# Patient Record
Sex: FEMALE | Race: WHITE | Employment: UNEMPLOYED | URBAN - METROPOLITAN AREA
[De-identification: names, ages, dates, MRNs, and addresses within clinical notes are randomized per-mention and may not be internally consistent; named-entity substitution may affect disease eponyms.]

---

## 2017-09-21 PROCEDURE — 87624 HPV HI-RISK TYP POOLED RSLT: CPT | Performed by: OBSTETRICS & GYNECOLOGY

## 2017-09-21 PROCEDURE — 88175 CYTOPATH C/V AUTO FLUID REDO: CPT | Performed by: OBSTETRICS & GYNECOLOGY

## 2018-12-05 ENCOUNTER — OFFICE VISIT (OUTPATIENT)
Dept: FAMILY MEDICINE CLINIC | Facility: CLINIC | Age: 38
End: 2018-12-05
Payer: COMMERCIAL

## 2018-12-05 VITALS
HEIGHT: 66 IN | WEIGHT: 130 LBS | HEART RATE: 73 BPM | DIASTOLIC BLOOD PRESSURE: 62 MMHG | SYSTOLIC BLOOD PRESSURE: 104 MMHG | BODY MASS INDEX: 20.89 KG/M2 | OXYGEN SATURATION: 99 % | TEMPERATURE: 99 F

## 2018-12-05 DIAGNOSIS — J02.9 SORE THROAT: Primary | ICD-10-CM

## 2018-12-05 DIAGNOSIS — J06.9 VIRAL UPPER RESPIRATORY TRACT INFECTION: ICD-10-CM

## 2018-12-05 PROCEDURE — 87880 STREP A ASSAY W/OPTIC: CPT | Performed by: FAMILY MEDICINE

## 2018-12-05 PROCEDURE — 99213 OFFICE O/P EST LOW 20 MIN: CPT | Performed by: FAMILY MEDICINE

## 2018-12-05 NOTE — PROGRESS NOTES
CHIEF COMPLAINT:   Patient presents with:  Sore Throat: x 3 dys         HPI:   Anusha Camacho is a 43year old female presents to clinic with complaint of sore throat. Patient has had for 3 days.  Patient denies congestion, cough, fever, headache, nausea, r mucosa pink and noninflamed  THROAT: oral mucosa pink, moist. Posterior pharynx erythematous and injected. no exudates. Tonsils 2/4. Breath not malodorous   NECK: supple, non-tender  LUNGS: clear to auscultation bilaterally, no wheezes or rhonchi.  Elina Gonzalez

## 2018-12-05 NOTE — PATIENT INSTRUCTIONS
Most viral illnesses get worse for the first 3-5 days, then plateau and improve gradually over the next 3-5 days. Monitor symptoms for now.    Use otc meds for comfort as needed--  Warm saltwater gargles  otc pain relief and cold remedies as needed-- [facility]

## 2018-12-10 ENCOUNTER — TELEPHONE (OUTPATIENT)
Dept: FAMILY MEDICINE CLINIC | Facility: CLINIC | Age: 38
End: 2018-12-10

## 2018-12-10 NOTE — TELEPHONE ENCOUNTER
KANDICE for patient confirming new patient appt with Dr. Chip Reid 12/10/18 at 12 noon. Through [employer] she indicated she wanted to cancel.

## 2019-01-11 ENCOUNTER — OFFICE VISIT (OUTPATIENT)
Dept: FAMILY MEDICINE CLINIC | Facility: CLINIC | Age: 39
End: 2019-01-11
Payer: COMMERCIAL

## 2019-01-11 VITALS
HEIGHT: 66 IN | WEIGHT: 129 LBS | HEART RATE: 80 BPM | RESPIRATION RATE: 14 BRPM | DIASTOLIC BLOOD PRESSURE: 60 MMHG | SYSTOLIC BLOOD PRESSURE: 122 MMHG | BODY MASS INDEX: 20.73 KG/M2

## 2019-01-11 DIAGNOSIS — Z12.31 VISIT FOR SCREENING MAMMOGRAM: ICD-10-CM

## 2019-01-11 DIAGNOSIS — Z00.00 ROUTINE GENERAL MEDICAL EXAMINATION AT A HEALTH CARE FACILITY: Primary | ICD-10-CM

## 2019-01-11 PROCEDURE — 99385 PREV VISIT NEW AGE 18-39: CPT | Performed by: FAMILY MEDICINE

## 2019-01-11 NOTE — PROGRESS NOTES
HPI:   Mamie Moss is a 43year old female who presents for a complete physical exam.  Last pap:  Seeing Dr. Natalie Jeffrey  Last mammogram:  No previous   Self exams:  yes  Menses:  regular  Contraception:  tubal  Previous colonoscopy:  n/a  Previous DEXA:  n/a. alzheimer   • Other (Other) Maternal Grandfather         liver cirhosis, alcoholic   • Cancer Paternal Grandfather         lung   • Cancer Father         prostate   • Other (Other) Mother         iritis   • Cancer Paternal Uncle         pancreatic       So exercise. · The patient is asked to return for CPX in 1 year.   Orders Placed This Encounter      Lipid Panel      Comp Metabolic Panel (14)      CBC, Platelet, No Differential [E]      TSH W Reflex To Free T4      Meds & Refills for this Visit:  Request

## 2019-02-11 ENCOUNTER — APPOINTMENT (OUTPATIENT)
Dept: LAB | Age: 39
End: 2019-02-11
Attending: FAMILY MEDICINE
Payer: COMMERCIAL

## 2019-02-11 DIAGNOSIS — Z00.00 ROUTINE GENERAL MEDICAL EXAMINATION AT A HEALTH CARE FACILITY: ICD-10-CM

## 2019-02-11 LAB
ALBUMIN SERPL-MCNC: 3.8 G/DL (ref 3.1–4.5)
ALBUMIN/GLOB SERPL: 1.2 {RATIO} (ref 1–2)
ALP LIVER SERPL-CCNC: 37 U/L (ref 37–98)
ALT SERPL-CCNC: 21 U/L (ref 14–54)
ANION GAP SERPL CALC-SCNC: 6 MMOL/L (ref 0–18)
AST SERPL-CCNC: 17 U/L (ref 15–41)
BILIRUB SERPL-MCNC: 0.6 MG/DL (ref 0.1–2)
BUN BLD-MCNC: 13 MG/DL (ref 8–20)
BUN/CREAT SERPL: 15.3 (ref 10–20)
CALCIUM BLD-MCNC: 9.1 MG/DL (ref 8.3–10.3)
CHLORIDE SERPL-SCNC: 108 MMOL/L (ref 101–111)
CHOLEST SMN-MCNC: 142 MG/DL (ref ?–200)
CO2 SERPL-SCNC: 25 MMOL/L (ref 22–32)
CREAT BLD-MCNC: 0.85 MG/DL (ref 0.55–1.02)
DEPRECATED RDW RBC AUTO: 45.1 FL (ref 35.1–46.3)
ERYTHROCYTE [DISTWIDTH] IN BLOOD BY AUTOMATED COUNT: 14.1 % (ref 11–15)
GLOBULIN PLAS-MCNC: 3.3 G/DL (ref 2.8–4.4)
GLUCOSE BLD-MCNC: 94 MG/DL (ref 70–99)
HCT VFR BLD AUTO: 37.7 % (ref 35–48)
HDLC SERPL-MCNC: 60 MG/DL (ref 40–59)
HGB BLD-MCNC: 12.1 G/DL (ref 12–16)
LDLC SERPL CALC-MCNC: 72 MG/DL (ref ?–100)
M PROTEIN MFR SERPL ELPH: 7.1 G/DL (ref 6.4–8.2)
MCH RBC QN AUTO: 28.3 PG (ref 26–34)
MCHC RBC AUTO-ENTMCNC: 32.1 G/DL (ref 31–37)
MCV RBC AUTO: 88.3 FL (ref 80–100)
NONHDLC SERPL-MCNC: 82 MG/DL (ref ?–130)
OSMOLALITY SERPL CALC.SUM OF ELEC: 288 MOSM/KG (ref 275–295)
PLATELET # BLD AUTO: 277 10(3)UL (ref 150–450)
POTASSIUM SERPL-SCNC: 4.2 MMOL/L (ref 3.6–5.1)
RBC # BLD AUTO: 4.27 X10(6)UL (ref 3.8–5.3)
SODIUM SERPL-SCNC: 139 MMOL/L (ref 136–144)
TRIGL SERPL-MCNC: 48 MG/DL (ref 30–149)
TSI SER-ACNC: 1.69 MIU/ML (ref 0.35–5.5)
VLDLC SERPL CALC-MCNC: 10 MG/DL (ref 0–30)
WBC # BLD AUTO: 5.6 X10(3) UL (ref 4–11)

## 2019-02-11 PROCEDURE — 80061 LIPID PANEL: CPT

## 2019-02-11 PROCEDURE — 84443 ASSAY THYROID STIM HORMONE: CPT

## 2019-02-11 PROCEDURE — 85027 COMPLETE CBC AUTOMATED: CPT

## 2019-02-11 PROCEDURE — 80053 COMPREHEN METABOLIC PANEL: CPT

## 2019-02-19 ENCOUNTER — TELEPHONE (OUTPATIENT)
Dept: FAMILY MEDICINE CLINIC | Facility: CLINIC | Age: 39
End: 2019-02-19

## 2019-02-19 NOTE — TELEPHONE ENCOUNTER
Patient notified. Patient would like to check Vit D level with next labs. Does not desire to do it sooner. Pt advised to discuss request with provider before next labs are ordered.

## 2019-02-20 ENCOUNTER — TELEPHONE (OUTPATIENT)
Dept: FAMILY MEDICINE CLINIC | Facility: CLINIC | Age: 39
End: 2019-02-20

## 2019-05-14 ENCOUNTER — TELEPHONE (OUTPATIENT)
Dept: FAMILY MEDICINE CLINIC | Facility: CLINIC | Age: 39
End: 2019-05-14

## 2019-05-14 PROCEDURE — 88360 TUMOR IMMUNOHISTOCHEM/MANUAL: CPT | Performed by: RADIOLOGY

## 2019-05-14 PROCEDURE — 88305 TISSUE EXAM BY PATHOLOGIST: CPT | Performed by: RADIOLOGY

## 2019-05-14 NOTE — TELEPHONE ENCOUNTER
Andrea Norwood from [facility] is calling requesting to speak to nurse regarding breast biopsy.  Patient needs to see surgeon and they need a name of a surgeon from Dr. Blanka Sosa

## 2019-05-16 ENCOUNTER — TELEPHONE (OUTPATIENT)
Dept: FAMILY MEDICINE CLINIC | Facility: CLINIC | Age: 39
End: 2019-05-16

## 2019-05-16 ENCOUNTER — TELEPHONE (OUTPATIENT)
Dept: HEMATOLOGY/ONCOLOGY | Facility: HOSPITAL | Age: 39
End: 2019-05-16

## 2019-05-16 NOTE — TELEPHONE ENCOUNTER
Spoke with patient regarding newly diagnosed breast cancer. 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 Tumor markers are pending. We will contact her with these results.  Pt has been referred to Dr. Nelida Sánchez, we discussed the multidisciplinary

## 2019-05-16 NOTE — TELEPHONE ENCOUNTER
Breast navigator with [facility] is calling to talk to Dr Sonia Singh about her results. Her test was done through [facility] and she thinks that they are under the impression that results will be given by Dr Sonia Singh office.

## 2019-05-16 NOTE — TELEPHONE ENCOUNTER
Discussed with Leanna Bean breast navigator she will call patient to give her the results and get her in with Dr Ambriz Flavin

## 2019-05-17 ENCOUNTER — TELEPHONE (OUTPATIENT)
Dept: HEMATOLOGY/ONCOLOGY | Facility: HOSPITAL | Age: 39
End: 2019-05-17

## 2019-05-20 NOTE — TELEPHONE ENCOUNTER
Spoke with patient regarding her pathology and biomarkers. We discussed that she is ER/HI+ and Her 2 negative. We discussed Ki 67. Pt has been scheduled with surgery and genetics and medical oncology.  She will phone in the interim with any other questions

## 2019-05-21 ENCOUNTER — GENETICS ENCOUNTER (OUTPATIENT)
Dept: GENETICS | Facility: HOSPITAL | Age: 39
End: 2019-05-21
Attending: INTERNAL MEDICINE
Payer: COMMERCIAL

## 2019-05-21 ENCOUNTER — NURSE NAVIGATOR ENCOUNTER (OUTPATIENT)
Dept: HEMATOLOGY/ONCOLOGY | Facility: HOSPITAL | Age: 39
End: 2019-05-21

## 2019-05-21 ENCOUNTER — APPOINTMENT (OUTPATIENT)
Dept: HEMATOLOGY/ONCOLOGY | Facility: HOSPITAL | Age: 39
End: 2019-05-21
Attending: INTERNAL MEDICINE
Payer: COMMERCIAL

## 2019-05-21 ENCOUNTER — OFFICE VISIT (OUTPATIENT)
Dept: SURGERY | Facility: CLINIC | Age: 39
End: 2019-05-21
Payer: COMMERCIAL

## 2019-05-21 VITALS
HEART RATE: 72 BPM | TEMPERATURE: 98 F | BODY MASS INDEX: 20.99 KG/M2 | DIASTOLIC BLOOD PRESSURE: 67 MMHG | OXYGEN SATURATION: 100 % | WEIGHT: 130.63 LBS | SYSTOLIC BLOOD PRESSURE: 105 MMHG | RESPIRATION RATE: 18 BRPM | HEIGHT: 65.98 IN

## 2019-05-21 DIAGNOSIS — Z17.0 MALIGNANT NEOPLASM OF UPPER-OUTER QUADRANT OF RIGHT BREAST IN FEMALE, ESTROGEN RECEPTOR POSITIVE (HCC): Primary | ICD-10-CM

## 2019-05-21 DIAGNOSIS — C50.411 MALIGNANT NEOPLASM OF UPPER-OUTER QUADRANT OF RIGHT BREAST IN FEMALE, ESTROGEN RECEPTOR POSITIVE (HCC): Primary | ICD-10-CM

## 2019-05-21 PROCEDURE — 99245 OFF/OP CONSLTJ NEW/EST HI 55: CPT | Performed by: SURGERY

## 2019-05-21 PROCEDURE — 99245 OFF/OP CONSLTJ NEW/EST HI 55: CPT | Performed by: INTERNAL MEDICINE

## 2019-05-21 NOTE — PROGRESS NOTES
Referring Provider: Dr. Joseline Jain          Reason for Referral:  Ms. Elizabeth Plaza was referred for genetic counseling because of a new diagnosis of IDC with DCIS of the right breast at age 35.       Ms. Aden Ferrer is a 35 y cancer cases are sporadic, approximately 5-10% of women with breast cancer have a hereditary cancer syndrome. Mutations in the genes, BRCA1 and BRCA2, account for the majority of hereditary breast and ovarian cancer families.   Mutations in genes other than for ovarian cancer. Ms. Aakash Costello indicated that these results may affect her surgical decision-making.        For some of the genes for which testing is available, the associated cancer risks have yet to be determined and medical management recommendations m was spent in consultation with Ms. Chew.

## 2019-05-21 NOTE — PROGRESS NOTES
Met with patient and her mom in clinic. Re- introduced myself as the breast navigator nurse and explained my role and how I will work with all of the physicians involved in her care.  Explained the role of all of the physicians involved in her care floresita

## 2019-05-21 NOTE — CONSULTS
Cancer Center Report of Consultation    Patient Name: Jordi Duarte   YOB: 1980   Medical Record Number: [id number]   CSN: [id number]   Consulting Physician: Naren Quezada MD  Referring Physician(s): Lane Sellers MD  Date of Consultation: TAB0  Ectopic0  Multiple1  Live Births3   Menarche age 14. LMP 5/19. 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 Has a 8year-old daughter (5th grade). Has 6year-old twins-boy and girl (2nd grade). Has a 3year-old son.     Allergies: regions. Chest: Clear to auscultation. Heart: Regular rate and rhythm. S1S2 normal.  Abdomen: Soft, non tender with good bowel sounds. No hepatosplenomegaly/mass. Extremities: Pedal pulses are present. No edema. Neurological: Grossly intact.      Brent Barr were spent  with the patient during this encounter and over  50% of that time  was spent face-to-face on counseling and coordination of care.   We discussed  patient’s prognosis, treatment options available for breast ca, including side effects of treatment

## 2019-05-22 NOTE — H&P
History provided by:patient and mother  HPI:   HPI  The patient is a 50-year-old female seen at the request of her primary care physician regarding a new diagnosis of breast cancer.   The patient underwent her first, routine mammogram.  This was followed by Use      Smoking status: Never Smoker      Smokeless tobacco: Never Used    Alcohol use: Yes      Alcohol/week: 1.2 oz      Types: 2 Standard drinks or equivalent per week      Comment: once a week    Drug use: No    Allergies/Medications:    Allergies:   D rhonchi. She has no rales. She exhibits no mass. Right breast exhibits no inverted nipple, no mass, no nipple discharge, no skin change and no tenderness.  Left breast exhibits no inverted nipple, no mass, no nipple discharge, no skin change and no tenderne RIGHT BREAST ULTRASOUND:  Targeted scans of the area of concern as well as four quadrant and  axillary scans were obtained. An irregular hypoechoic area is identified at the 10:00 position, 5 cm  from the nipple, felt to correspond to site of concern.  It Result Interpretation   Estrogen Receptor   -   6F11 100 % Positive Cells Strong Positive   Progesterone Receptor   -   16 95 % Positive Cells Strong Positive   KI-67   -   MM1 7 % Positive Cells Low   Her2   -   CB11 1+ Negative     this.     · In regards to chemotherapy, she has met with Dr. Wilder Thakkar. Based on the final pathology report it will be determined if chemotherapy would provide any additional benefit. · Because of her age, she does qualify for genetic testing.   She has met

## 2019-05-28 ENCOUNTER — HOSPITAL ENCOUNTER (OUTPATIENT)
Dept: MRI IMAGING | Facility: HOSPITAL | Age: 39
Discharge: HOME | End: 2019-05-28
Attending: SURGERY
Payer: COMMERCIAL

## 2019-05-28 DIAGNOSIS — C50.411 MALIGNANT NEOPLASM OF UPPER-OUTER QUADRANT OF RIGHT BREAST IN FEMALE, ESTROGEN RECEPTOR POSITIVE (HCC): ICD-10-CM

## 2019-05-28 DIAGNOSIS — Z17.0 MALIGNANT NEOPLASM OF UPPER-OUTER QUADRANT OF RIGHT BREAST IN FEMALE, ESTROGEN RECEPTOR POSITIVE (HCC): ICD-10-CM

## 2019-05-28 PROCEDURE — A9575 INJ GADOTERATE MEGLUMI 0.1ML: HCPCS

## 2019-05-28 PROCEDURE — 77049 MRI BREAST C-+ W/CAD BI: CPT | Performed by: SURGERY

## 2019-05-29 ENCOUNTER — TELEPHONE (OUTPATIENT)
Dept: HEMATOLOGY/ONCOLOGY | Facility: HOSPITAL | Age: 39
End: 2019-05-29

## 2019-05-29 ENCOUNTER — TELEPHONE (OUTPATIENT)
Dept: SURGERY | Facility: CLINIC | Age: 39
End: 2019-05-29

## 2019-05-29 ENCOUNTER — GENETICS ENCOUNTER (OUTPATIENT)
Dept: HEMATOLOGY/ONCOLOGY | Facility: HOSPITAL | Age: 39
End: 2019-05-29

## 2019-05-29 DIAGNOSIS — C50.411 MALIGNANT NEOPLASM OF UPPER-OUTER QUADRANT OF RIGHT BREAST IN FEMALE, ESTROGEN RECEPTOR POSITIVE (HCC): Primary | ICD-10-CM

## 2019-05-29 DIAGNOSIS — R92.8 ABNORMAL MRI, BREAST: ICD-10-CM

## 2019-05-29 DIAGNOSIS — Z17.0 MALIGNANT NEOPLASM OF UPPER-OUTER QUADRANT OF RIGHT BREAST IN FEMALE, ESTROGEN RECEPTOR POSITIVE (HCC): Primary | ICD-10-CM

## 2019-05-29 NOTE — TELEPHONE ENCOUNTER
I called the patient did discuss her MRI results. The right breast MRI shows 6.7 cm of disease.   I feel she requires a right breast mastectomy to adequately control the disease in her breast.  She would still be a candidate for nipple sparing mastectomy f

## 2019-05-29 NOTE — PROGRESS NOTES
Referring Provider:       Dr. Sherice Ibarra                                               Reason for Referral:  Ms. Bisi Nevarez had [facility] Breast Cancer STAT panel genetic testing per del/dup), GREM1 (only del/dup), HOXB13, KIT, MEN1,  NTHL1 (sequencing only), MLH1, MSH2, MSH3, MSH6, MUTYH, NBN, PALB2, PDGFRA, PMS2, POLD1, POLE, PTEN, RAD50, RAD51C, RAD51D, SDHB, SDHA, SDHC, SDHD, SMAD4, RSPCO8O, STK11, TP53, VHL.   These results were ph

## 2019-05-29 NOTE — TELEPHONE ENCOUNTER
Spoke with patient regarding recent MRI results. We discussed recommendation for mastectomy and also MRI guided biopsy. Pt verbalizes understanding of these.  Pt states that she did see a [facility] yesterday, and is meeting with the plastic juliane

## 2019-10-17 ENCOUNTER — TELEPHONE (OUTPATIENT)
Dept: FAMILY MEDICINE CLINIC | Facility: CLINIC | Age: 39
End: 2019-10-17

## 2019-10-17 NOTE — TELEPHONE ENCOUNTER
Patient called requesting to speak with nurse to go over previous lab results, had questions regarding glucose levels

## 2020-09-28 ENCOUNTER — APPOINTMENT (OUTPATIENT)
Dept: LAB | Age: 40
End: 2020-09-28
Attending: OBSTETRICS & GYNECOLOGY
Payer: COMMERCIAL

## 2020-09-28 PROBLEM — E55.9 VITAMIN D DEFICIENCY: Status: ACTIVE | Noted: 2020-09-28

## 2020-09-28 PROCEDURE — 36415 COLL VENOUS BLD VENIPUNCTURE: CPT | Performed by: OBSTETRICS & GYNECOLOGY

## 2020-09-28 PROCEDURE — 82306 VITAMIN D 25 HYDROXY: CPT | Performed by: OBSTETRICS & GYNECOLOGY

## 2021-04-09 DIAGNOSIS — Z23 NEED FOR VACCINATION: ICD-10-CM

## 2021-10-01 ENCOUNTER — TELEPHONE (OUTPATIENT)
Dept: FAMILY MEDICINE CLINIC | Facility: CLINIC | Age: 41
End: 2021-10-01

## 2021-10-01 DIAGNOSIS — Z00.00 LABORATORY EXAMINATION ORDERED AS PART OF A COMPLETE PHYSICAL EXAMINATION: Primary | ICD-10-CM

## 2021-10-01 NOTE — TELEPHONE ENCOUNTER
Please add Vitamin D only if covered by insurance. Please enter lab orders for the patient's upcoming physical appointment. Physical scheduled:    Your appointments     Date & Time Appointment Department [facility])    Oct 12, 2021  8:00 AM CDT Physical

## 2021-10-04 NOTE — TELEPHONE ENCOUNTER
Spoke to patient to advise her that Vitamin D was ordered by another physician but that her fasting labs were entered to "[facility]. She verbalized understanding.

## 2021-10-12 ENCOUNTER — OFFICE VISIT (OUTPATIENT)
Dept: FAMILY MEDICINE CLINIC | Facility: CLINIC | Age: 41
End: 2021-10-12
Payer: COMMERCIAL

## 2021-10-12 VITALS
HEART RATE: 68 BPM | OXYGEN SATURATION: 99 % | RESPIRATION RATE: 16 BRPM | BODY MASS INDEX: 20.57 KG/M2 | SYSTOLIC BLOOD PRESSURE: 100 MMHG | WEIGHT: 128 LBS | DIASTOLIC BLOOD PRESSURE: 60 MMHG | TEMPERATURE: 97 F | HEIGHT: 66.2 IN

## 2021-10-12 DIAGNOSIS — Z17.0 MALIGNANT NEOPLASM OF UPPER-OUTER QUADRANT OF RIGHT BREAST IN FEMALE, ESTROGEN RECEPTOR POSITIVE (HCC): ICD-10-CM

## 2021-10-12 DIAGNOSIS — C50.411 MALIGNANT NEOPLASM OF UPPER-OUTER QUADRANT OF RIGHT BREAST IN FEMALE, ESTROGEN RECEPTOR POSITIVE (HCC): ICD-10-CM

## 2021-10-12 DIAGNOSIS — R05.3 CHRONIC COUGH: ICD-10-CM

## 2021-10-12 DIAGNOSIS — Z00.00 WELL ADULT EXAM: Primary | ICD-10-CM

## 2021-10-12 PROCEDURE — 3074F SYST BP LT 130 MM HG: CPT | Performed by: PHYSICIAN ASSISTANT

## 2021-10-12 PROCEDURE — 99396 PREV VISIT EST AGE 40-64: CPT | Performed by: PHYSICIAN ASSISTANT

## 2021-10-12 PROCEDURE — 3008F BODY MASS INDEX DOCD: CPT | Performed by: PHYSICIAN ASSISTANT

## 2021-10-12 PROCEDURE — 3078F DIAST BP <80 MM HG: CPT | Performed by: PHYSICIAN ASSISTANT

## 2021-10-12 RX ORDER — THYROID 30 MG/1
30 TABLET ORAL DAILY
COMMUNITY
Start: 2021-09-21

## 2021-10-12 NOTE — PROGRESS NOTES
DATE OF EXAM  10/12/2021    CHIEF COMPLAINT/REASON FOR VISIT  Annual exam.    HISTORY OF PRESENT ILLNESS  Antonio Cook presents today for routine annual physical examination. She has been doing pretty well overall.  Notes that she has had a persistent dr Never Used    Vaping Use      Vaping Use: Never used    Substance and Sexual Activity      Alcohol use:  Yes        Alcohol/week: 2.0 standard drinks        Types: 2 Standard drinks or equivalent per week        Comment: once a week      Drug use: No      S No nausea, vomiting, diarrhea, or constipation. No change in bowel movements. No black or tarry stools or blood in the stool. : No increased frequency or urgency. No hematuria. No menstrual problems.  BREASTS:  Patient denies any lumps, bumps, discharge monitoring moles, adequate calcium and vitamin D intake. Health maintenance is up to date as shown above. Recommend complete physical exams every year.     2. Chronic cough  Recommend that we get chest x-ray- low threshold for imaging with hx of breast can

## 2021-10-13 ENCOUNTER — HOSPITAL ENCOUNTER (OUTPATIENT)
Dept: GENERAL RADIOLOGY | Age: 41
Discharge: HOME | End: 2021-10-13
Attending: PHYSICIAN ASSISTANT
Payer: COMMERCIAL

## 2021-10-13 DIAGNOSIS — R05.3 CHRONIC COUGH: ICD-10-CM

## 2021-10-13 PROCEDURE — 71046 X-RAY EXAM CHEST 2 VIEWS: CPT | Performed by: PHYSICIAN ASSISTANT

## 2021-11-02 ENCOUNTER — HOSPITAL ENCOUNTER (OUTPATIENT)
Age: 41
Discharge: HOME | End: 2021-11-02
Payer: COMMERCIAL

## 2021-11-02 VITALS
HEIGHT: 67 IN | DIASTOLIC BLOOD PRESSURE: 71 MMHG | BODY MASS INDEX: 20.4 KG/M2 | TEMPERATURE: 100 F | HEART RATE: 57 BPM | SYSTOLIC BLOOD PRESSURE: 108 MMHG | OXYGEN SATURATION: 98 % | WEIGHT: 130 LBS | RESPIRATION RATE: 18 BRPM

## 2021-11-02 DIAGNOSIS — Z11.52 ENCOUNTER FOR SCREENING FOR COVID-19: ICD-10-CM

## 2021-11-02 DIAGNOSIS — R11.2 NAUSEA AND VOMITING IN ADULT: Primary | ICD-10-CM

## 2021-11-02 DIAGNOSIS — Z20.822 LAB TEST NEGATIVE FOR COVID-19 VIRUS: ICD-10-CM

## 2021-11-02 PROCEDURE — 99213 OFFICE O/P EST LOW 20 MIN: CPT | Performed by: PHYSICIAN ASSISTANT

## 2021-11-02 PROCEDURE — U0002 COVID-19 LAB TEST NON-CDC: HCPCS | Performed by: PHYSICIAN ASSISTANT

## 2021-11-02 NOTE — ED PROVIDER NOTES
Patient Seen in: [facility] [address]      History   Patient presents with:  Covid-19 Test    Stated Complaint: [id number] testing (family of 4)    Subjective:   HPI    58-year-old female presents to the IC for Covid testing.   Has had a few days and neck supple. Skin:     General: Skin is warm and dry. Capillary Refill: Capillary refill takes less than 2 seconds. Neurological:      Mental Status: She is alert.              ED Course     Labs Reviewed   RAPID SARS-COV-2 BY PCR - Normal

## 2022-04-18 ENCOUNTER — TELEPHONE (OUTPATIENT)
Dept: FAMILY MEDICINE CLINIC | Facility: CLINIC | Age: 42
End: 2022-04-18

## 2022-04-18 NOTE — TELEPHONE ENCOUNTER
Patient has covid and feels fine however her parents are hounding her to contact her doctor and ask about the medication that is out for people who have covid and see if she can get a prescription for it.  Patient calling for the sake of her parents

## 2022-04-20 NOTE — TELEPHONE ENCOUNTER
Called [facility] that she has comorbidity (cancer of breast) but if it is more than 5 days from onset of symptoms it is too late to start this

## 2022-04-27 RX ORDER — SPIRONOLACTONE 25 MG/1
25 TABLET ORAL DAILY
COMMUNITY
Start: 2022-03-08

## 2022-05-03 ENCOUNTER — OFFICE VISIT (OUTPATIENT)
Dept: FAMILY MEDICINE CLINIC | Facility: CLINIC | Age: 42
End: 2022-05-03
Payer: COMMERCIAL

## 2022-05-03 VITALS
BODY MASS INDEX: 21.38 KG/M2 | HEART RATE: 72 BPM | RESPIRATION RATE: 16 BRPM | TEMPERATURE: 97 F | DIASTOLIC BLOOD PRESSURE: 60 MMHG | OXYGEN SATURATION: 99 % | WEIGHT: 133 LBS | HEIGHT: 66 IN | SYSTOLIC BLOOD PRESSURE: 100 MMHG

## 2022-05-03 DIAGNOSIS — R49.0 HOARSENESS OF VOICE: ICD-10-CM

## 2022-05-03 DIAGNOSIS — R05.9 COUGH: Primary | ICD-10-CM

## 2022-05-03 PROCEDURE — 3078F DIAST BP <80 MM HG: CPT | Performed by: NURSE PRACTITIONER

## 2022-05-03 PROCEDURE — 3074F SYST BP LT 130 MM HG: CPT | Performed by: NURSE PRACTITIONER

## 2022-05-03 PROCEDURE — 3008F BODY MASS INDEX DOCD: CPT | Performed by: NURSE PRACTITIONER

## 2022-05-03 PROCEDURE — 99213 OFFICE O/P EST LOW 20 MIN: CPT | Performed by: NURSE PRACTITIONER

## 2022-05-03 RX ORDER — FLUTICASONE PROPIONATE 50 MCG
1 SPRAY, SUSPENSION (ML) NASAL 2 TIMES DAILY
Qty: 11.1 ML | Refills: 0 | Status: SHIPPED | OUTPATIENT
Start: 2022-05-03 | End: 2022-05-10

## 2022-05-03 RX ORDER — PREDNISONE 20 MG/1
40 TABLET ORAL DAILY
Qty: 10 TABLET | Refills: 0 | Status: SHIPPED | OUTPATIENT
Start: 2022-05-03

## 2022-05-03 NOTE — PATIENT INSTRUCTIONS
Take prednisone 2 tabs daily, at the same time, for 5 days. Use Flonase, one spray each nostril, morning and evening. Gargle with warm salt water solution 3-5 times daily. Dissolve 1/2 teaspoon salt in half cup of warm tap water. Gargle and spit.

## 2022-07-27 ENCOUNTER — TELEPHONE (OUTPATIENT)
Dept: FAMILY MEDICINE CLINIC | Facility: CLINIC | Age: 42
End: 2022-07-27

## 2022-07-27 NOTE — TELEPHONE ENCOUNTER
Pt calling to speak to a nurse in regards to staph infection. Thinks she may have it as her two kids also had it. Wants to know if it is contagious. Pt requested appt but nothing available until tomorrow. Pt unable to come in as she will be going out of town.

## 2022-07-27 NOTE — TELEPHONE ENCOUNTER
In June her son scraped his leg and had a rash treated for staph and got better then her daughter got an abrasion on her back at a trampoline park that was a staph infection.    She started having itchy skin on abdominal area on now has red light rash in 4 small places she feels they look like the infection her daughter and son had, we discussed this and she is going to use the mupropricin ointment and cleanse with Hibiclens once a day and soap at night for 3 days if not getting better she will either go to [facility] or call for an appointment

## 2023-01-11 ENCOUNTER — OFFICE VISIT (OUTPATIENT)
Dept: FAMILY MEDICINE CLINIC | Facility: CLINIC | Age: 43
End: 2023-01-11
Payer: COMMERCIAL

## 2023-01-11 VITALS
WEIGHT: 137 LBS | HEIGHT: 66 IN | HEART RATE: 69 BPM | BODY MASS INDEX: 22.02 KG/M2 | DIASTOLIC BLOOD PRESSURE: 62 MMHG | OXYGEN SATURATION: 99 % | TEMPERATURE: 98 F | SYSTOLIC BLOOD PRESSURE: 90 MMHG

## 2023-01-11 DIAGNOSIS — M79.605 PAIN OF LEFT LOWER EXTREMITY: Primary | ICD-10-CM

## 2023-01-11 PROCEDURE — 3074F SYST BP LT 130 MM HG: CPT | Performed by: STUDENT IN AN ORGANIZED HEALTH CARE EDUCATION/TRAINING PROGRAM

## 2023-01-11 PROCEDURE — 3008F BODY MASS INDEX DOCD: CPT | Performed by: STUDENT IN AN ORGANIZED HEALTH CARE EDUCATION/TRAINING PROGRAM

## 2023-01-11 PROCEDURE — 99213 OFFICE O/P EST LOW 20 MIN: CPT | Performed by: STUDENT IN AN ORGANIZED HEALTH CARE EDUCATION/TRAINING PROGRAM

## 2023-01-11 PROCEDURE — 3078F DIAST BP <80 MM HG: CPT | Performed by: STUDENT IN AN ORGANIZED HEALTH CARE EDUCATION/TRAINING PROGRAM

## 2023-04-10 ENCOUNTER — OFFICE VISIT (OUTPATIENT)
Dept: FAMILY MEDICINE CLINIC | Facility: CLINIC | Age: 43
End: 2023-04-10
Payer: COMMERCIAL

## 2023-04-10 VITALS
DIASTOLIC BLOOD PRESSURE: 80 MMHG | WEIGHT: 136 LBS | RESPIRATION RATE: 16 BRPM | SYSTOLIC BLOOD PRESSURE: 122 MMHG | BODY MASS INDEX: 21.86 KG/M2 | OXYGEN SATURATION: 98 % | HEIGHT: 66 IN | HEART RATE: 70 BPM

## 2023-04-10 DIAGNOSIS — J02.0 STREP PHARYNGITIS: ICD-10-CM

## 2023-04-10 DIAGNOSIS — K42.9 UMBILICAL HERNIA WITHOUT OBSTRUCTION AND WITHOUT GANGRENE: Primary | ICD-10-CM

## 2023-04-10 DIAGNOSIS — R09.82 PND (POST-NASAL DRIP): ICD-10-CM

## 2023-04-10 PROCEDURE — 3008F BODY MASS INDEX DOCD: CPT | Performed by: FAMILY MEDICINE

## 2023-04-10 PROCEDURE — 99213 OFFICE O/P EST LOW 20 MIN: CPT | Performed by: FAMILY MEDICINE

## 2023-04-10 PROCEDURE — 3079F DIAST BP 80-89 MM HG: CPT | Performed by: FAMILY MEDICINE

## 2023-04-10 PROCEDURE — 3074F SYST BP LT 130 MM HG: CPT | Performed by: FAMILY MEDICINE

## 2023-04-10 RX ORDER — AMOXICILLIN 500 MG/1
CAPSULE ORAL
COMMUNITY
Start: 2023-04-03

## 2023-04-10 RX ORDER — FLUTICASONE PROPIONATE 50 MCG
2 SPRAY, SUSPENSION (ML) NASAL DAILY
Qty: 16 G | Refills: 0 | Status: SHIPPED | OUTPATIENT
Start: 2023-04-10 | End: 2024-04-04

## 2023-04-28 ENCOUNTER — OFFICE VISIT (OUTPATIENT)
Dept: FAMILY MEDICINE CLINIC | Facility: CLINIC | Age: 43
End: 2023-04-28
Payer: COMMERCIAL

## 2023-04-28 VITALS
SYSTOLIC BLOOD PRESSURE: 88 MMHG | BODY MASS INDEX: 21.28 KG/M2 | HEART RATE: 71 BPM | WEIGHT: 132.38 LBS | DIASTOLIC BLOOD PRESSURE: 60 MMHG | HEIGHT: 66 IN | OXYGEN SATURATION: 100 %

## 2023-04-28 DIAGNOSIS — R49.9 CHANGE OF VOICE: ICD-10-CM

## 2023-04-28 DIAGNOSIS — J06.9 VIRAL URI: Primary | ICD-10-CM

## 2023-04-28 PROCEDURE — 99213 OFFICE O/P EST LOW 20 MIN: CPT | Performed by: STUDENT IN AN ORGANIZED HEALTH CARE EDUCATION/TRAINING PROGRAM

## 2023-04-28 PROCEDURE — 3008F BODY MASS INDEX DOCD: CPT | Performed by: STUDENT IN AN ORGANIZED HEALTH CARE EDUCATION/TRAINING PROGRAM

## 2023-04-28 PROCEDURE — 3074F SYST BP LT 130 MM HG: CPT | Performed by: STUDENT IN AN ORGANIZED HEALTH CARE EDUCATION/TRAINING PROGRAM

## 2023-04-28 PROCEDURE — 3078F DIAST BP <80 MM HG: CPT | Performed by: STUDENT IN AN ORGANIZED HEALTH CARE EDUCATION/TRAINING PROGRAM

## 2024-01-06 ENCOUNTER — OFFICE VISIT (OUTPATIENT)
Dept: FAMILY MEDICINE CLINIC | Facility: CLINIC | Age: 44
End: 2024-01-06
Payer: COMMERCIAL

## 2024-01-06 VITALS
BODY MASS INDEX: 21 KG/M2 | TEMPERATURE: 98 F | SYSTOLIC BLOOD PRESSURE: 90 MMHG | HEART RATE: 72 BPM | OXYGEN SATURATION: 98 % | WEIGHT: 130.19 LBS | DIASTOLIC BLOOD PRESSURE: 60 MMHG | RESPIRATION RATE: 16 BRPM

## 2024-01-06 DIAGNOSIS — M94.0 COSTOCHONDRITIS: Primary | ICD-10-CM

## 2024-01-06 PROCEDURE — 3078F DIAST BP <80 MM HG: CPT | Performed by: STUDENT IN AN ORGANIZED HEALTH CARE EDUCATION/TRAINING PROGRAM

## 2024-01-06 PROCEDURE — 3074F SYST BP LT 130 MM HG: CPT | Performed by: STUDENT IN AN ORGANIZED HEALTH CARE EDUCATION/TRAINING PROGRAM

## 2024-01-06 PROCEDURE — 99213 OFFICE O/P EST LOW 20 MIN: CPT | Performed by: STUDENT IN AN ORGANIZED HEALTH CARE EDUCATION/TRAINING PROGRAM

## 2024-01-06 RX ORDER — MELOXICAM 7.5 MG/1
7.5 TABLET ORAL DAILY
Qty: 14 TABLET | Refills: 0 | Status: SHIPPED | OUTPATIENT
Start: 2024-01-06

## 2024-01-06 NOTE — PROGRESS NOTES
[facility]    Chief Complaint   Patient presents with    Pain     Right side of rib pain is present while laying down or getting up and sore during the day         HPI:   India Chew is 43 year old patient presenting for the followin. Rib pain. To the right of the sternum. She notices this when rolling over in bed at night or when she lies down. This started about a month ago. She first noticed this during pilates when she went to lie down. It is like stabbing pain when it is at its worst.   No injury or trauma. No recent illness or febrile illness.     She does have hx of breast cancer, has hx of double completed in 2019 with expander and reconstruction. Continues to follow regularly with her cancer doctor. Endocrine therapy. Last visit 23. Last two notes from hematology/oncology reviewed.    PMH:  Patient Active Problem List   Diagnosis    Iron deficiency    B12 deficiency    Gluten intolerance    FH: cystic fibrosis    Malignant neoplasm of upper-outer quadrant of right breast in female, estrogen receptor positive  (HCC)    Vitamin D deficiency     Past Medical History:   Diagnosis Date    ANEMIA     Breast CA (HCC)     HEADACHES     from gluten allergy    Iritis     Lyme disease     SEASONAL ALLERGIES           SH: reviewed     FH: reviewed        ROS: full 10 point review of systems done and otherwise negative.      Healthcare Maintenance:not discussed       PE:  There were no vitals filed for this visit.  Wt Readings from Last 3 Encounters:   24 130 lb 3.2 oz (59.1 kg)   23 132 lb 6.4 oz (60.1 kg)   04/10/23 136 lb (61.7 kg)     Body mass index is 21.01 kg/m².     Physical Exam:  GEN: Well-appearing, NAD, nontoxic  HEENT: NC/AT, PERRL, EOMI, TM without erythema or bulging bilaterally and normal ear canals, no nasal polyps, oropharynx clear without erythema or exudate, MMM  BREAST: bilateral post-surgical changes, well-healed scars, no erythema, warmth, mass,  or tenderness. +ttp over chest wall medial to the right breast in costochondral spaces  CARD: RRR, no m/r/g  PULM: CTA xavier  ABD: soft, nt, nd  EXT: No gross deformity  NEURO: no gross deficit  PSYCH: normal affect, thought process linear     No visits with results within 4 Week(s) from this visit.   Latest known visit with results is:   Admission on 2021, Discharged on 2021   Component Date Value Ref Range Status    Rapid SARS-CoV-2 by PCR 2021 Not Detected  Not Detected Final        A/P: India Chew is 43 year old presenting for the followin. 1. Costochondritis - no evidence of mastitis or breast mass. Hydration, NSAIDS, heat or ice as tolerated.   - Meloxicam 7.5 MG Oral Tab; Take 1 tablet (7.5 mg total) by mouth daily.  Dispense: 14 tablet; Refill: 0          Outpatient Encounter Medications as of 2024   Medication Sig Dispense Refill    fluticasone propionate 50 MCG/ACT Nasal Suspension 2 sprays by Each Nare route daily. 16 g 0    NON FORMULARY Take by mouth As Directed. B COMPLEX WITH VITAMIN C (VITAMIN B COMPLEX-C ORAL)      NON FORMULARY Take by mouth as needed. ergocalciferol, vitamin D2, (VITAMIN D ORAL)      ARMOUR THYROID 30 MG Oral Tab Take 1 tablet (30 mg total) by mouth daily.      Tamoxifen Citrate 20 MG Oral Tab Take 1 tablet (20 mg total) by mouth daily.      Cyanocobalamin (B-12 OR) Take by mouth.      Ergocalciferol (VITAMIN D OR) Take by mouth.       No facility-administered encounter medications on file as of 2024.           Side effects, risks and benefits of medications were explained.  The patient or responsible adult showed the ability to learn, asked appropriate questions.  There were no barriers to learning and they verbalized understanding of the treatment plan.     Medication list provided to patient and /or family member.        Norma Buckley MD

## 2024-01-25 ENCOUNTER — TELEPHONE (OUTPATIENT)
Dept: FAMILY MEDICINE CLINIC | Facility: CLINIC | Age: 44
End: 2024-01-25

## 2024-01-25 DIAGNOSIS — Z13.6 SCREENING FOR CARDIOVASCULAR CONDITION: Primary | ICD-10-CM

## 2024-01-25 NOTE — TELEPHONE ENCOUNTER
Please enter lab orders for the patient's upcoming physical appointment.     Physical scheduled:   Your appointments       Date & Time Appointment Department ([place])    Feb 08, 2024  9:40 AM CST Physical - Established with Norma Buckley MD [facility]              [facility]  [address]  [facility] [id number] [phone]           Preferred lab: [facility]     The patient has been notified to complete fasting labs prior to their physical appointment.

## 2024-02-07 LAB
ABSOLUTE BASOPHILS: 64 CELLS/UL (ref 0–200)
ABSOLUTE EOSINOPHILS: 112 CELLS/UL (ref 15–500)
ABSOLUTE LYMPHOCYTES: 1776 CELLS/UL (ref 850–3900)
ABSOLUTE MONOCYTES: 616 CELLS/UL (ref 200–950)
ABSOLUTE NEUTROPHILS: 5432 CELLS/UL (ref 1500–7800)
ALBUMIN/GLOBULIN RATIO: 1.7 (CALC) (ref 1–2.5)
ALBUMIN: 4.3 G/DL (ref 3.6–5.1)
ALKALINE PHOSPHATASE: 29 U/L (ref 31–125)
ALT: 11 U/L (ref 6–29)
AST: 19 U/L (ref 10–30)
BASOPHILS: 0.8 %
BILIRUBIN, TOTAL: 0.5 MG/DL (ref 0.2–1.2)
BUN: 12 MG/DL (ref 7–25)
CALCIUM: 9.3 MG/DL (ref 8.6–10.2)
CARBON DIOXIDE: 26 MMOL/L (ref 20–32)
CHLORIDE: 106 MMOL/L (ref 98–110)
CHOL/HDLC RATIO: 2.5 (CALC)
CHOLESTEROL, TOTAL: 154 MG/DL
CREATININE: 0.8 MG/DL (ref 0.5–0.99)
EGFR: 93 ML/MIN/1.73M2
EOSINOPHILS: 1.4 %
GLOBULIN: 2.6 G/DL (CALC) (ref 1.9–3.7)
GLUCOSE: 92 MG/DL (ref 65–99)
HDL CHOLESTEROL: 62 MG/DL
HEMATOCRIT: 38.6 % (ref 35–45)
HEMOGLOBIN: 12.8 G/DL (ref 11.7–15.5)
LDL-CHOLESTEROL: 76 MG/DL (CALC)
LYMPHOCYTES: 22.2 %
MCH: 29.5 PG (ref 27–33)
MCHC: 33.2 G/DL (ref 32–36)
MCV: 88.9 FL (ref 80–100)
MONOCYTES: 7.7 %
MPV: 12.3 FL (ref 7.5–12.5)
NEUTROPHILS: 67.9 %
NON-HDL CHOLESTEROL: 92 MG/DL (CALC)
PLATELET COUNT: 274 THOUSAND/UL (ref 140–400)
POTASSIUM: 4.6 MMOL/L (ref 3.5–5.3)
PROTEIN, TOTAL: 6.9 G/DL (ref 6.1–8.1)
RDW: 12.9 % (ref 11–15)
RED BLOOD CELL COUNT: 4.34 MILLION/UL (ref 3.8–5.1)
SODIUM: 137 MMOL/L (ref 135–146)
TRIGLYCERIDES: 76 MG/DL
TSH W/REFLEX TO FT4: 2.14 MIU/L
WHITE BLOOD CELL COUNT: 8 THOUSAND/UL (ref 3.8–10.8)

## 2024-02-08 ENCOUNTER — OFFICE VISIT (OUTPATIENT)
Dept: FAMILY MEDICINE CLINIC | Facility: CLINIC | Age: 44
End: 2024-02-08
Payer: COMMERCIAL

## 2024-02-08 VITALS
SYSTOLIC BLOOD PRESSURE: 88 MMHG | DIASTOLIC BLOOD PRESSURE: 52 MMHG | HEIGHT: 66 IN | WEIGHT: 131 LBS | BODY MASS INDEX: 21.05 KG/M2

## 2024-02-08 DIAGNOSIS — Z00.00 ENCOUNTER FOR ROUTINE HISTORY AND PHYSICAL EXAMINATION: Primary | ICD-10-CM

## 2024-02-08 DIAGNOSIS — Z01.84 IMMUNITY TO MEASLES, MUMPS, AND RUBELLA DETERMINED BY SEROLOGIC TEST: ICD-10-CM

## 2024-02-08 DIAGNOSIS — Z11.1 SCREENING-PULMONARY TB: ICD-10-CM

## 2024-02-08 NOTE — PROGRESS NOTES
[facility]    CC: yearly exam     HPI: India Chew is 43 year old female here for a yearly physical.  1. Hx ER+ breast cancer, has hx of double completed in 2019 with expander and reconstruction. Continues to follow regularly with her cancer doctor. Endocrine therapy. Last visit 7/13/23. Last two notes from hematology/oncology reviewed. Due for 6m follow up and she jacqueline lschedule.  2. Healthcare maintenance.  Exercise -  pilates and walking. Previous chest wall discomfort is gradually resolving. Only bothers her now with some pilates activity. Sunscreen -.  Caffeine - none.  Advanced directives- not yet  3. Gluten intolerance/Hx iron deficiency.  Hgb 12.8 2/24     Pap - prefers to see her gyn.    PMH:  Patient Active Problem List   Diagnosis    Iron deficiency    B12 deficiency    Gluten intolerance    FH: cystic fibrosis    Malignant neoplasm of upper-outer quadrant of right breast in female, estrogen receptor positive  (HCC)    Vitamin D deficiency     Past Medical History:   Diagnosis Date    ANEMIA     Breast CA (HCC) 2019    HEADACHES     from gluten allergy    Iritis     Lyme disease     SEASONAL ALLERGIES        Last Physical 2/8/24     SH: reviewed     FH: reviewed     Social History     Social History Narrative    Not on file        ROS: full 10 point review of systems done and otherwise negative.      Healthcare Maintenance:  Pap: 3/22/2021 negative cotesting -[facility]. Find in care   Health Maintenance   Topic Date Due    Pneumococcal Vaccine: Birth to 64yrs (1 of 2 - PCV) Never done    Pap Smear  09/21/2022    Annual Physical  10/12/2022    COVID-19 Vaccine (3 - 2023-24 season) 09/01/2023    Influenza Vaccine (1) 10/01/2023    Annual Depression Screening  01/01/2024    DTaP,Tdap,and Td Vaccines (3 - Td or Tdap) 08/17/2024    Mammogram  Discontinued     Health Habits:  Smoking. negative  Alcohol Use. negative  Dental Exam. Recommend q6m cleans  Eye Exam. Recommend q1-2y  screens       PE:  Vital Signs    02/08/24 0944   BP: (!) 88/52   PainSc: 0 - (None)     Wt Readings from Last 3 Encounters:   02/08/24 131 lb (59.4 kg)   01/06/24 130 lb 3.2 oz (59.1 kg)   04/28/23 132 lb 6.4 oz (60.1 kg)     Body mass index is 21.14 kg/m².     General: Comfortable, pleasant, NAD, appears stated age  HEENT.  NC/AT, no conjunctival injection, TMs clear, oropharynx without erythema or exudate, neck supple, no LAD or thyromegaly  CV.  RRR, no m/r/g  Pulm. CTAB, no W/R/R, comfortable work of breathing, speaks in full sentences  Abdomen. Soft, nt, nd  Extremities.  2+ DP pulses, no edema  Skin.  No concerning moles      Telephone on 01/25/2024   Component Date Value Ref Range Status    WHITE BLOOD CELL COUNT 02/06/2024 8.0  3.8 - 10.8 Thousand/uL Final    RED BLOOD CELL COUNT 02/06/2024 4.34  3.80 - 5.10 Million/uL Final    HEMOGLOBIN 02/06/2024 12.8  11.7 - 15.5 g/dL Final    HEMATOCRIT 02/06/2024 38.6  35.0 - 45.0 % Final    MCV 02/06/2024 88.9  80.0 - 100.0 fL Final    MCH 02/06/2024 29.5  27.0 - 33.0 pg Final    MCHC 02/06/2024 33.2  32.0 - 36.0 g/dL Final    RDW 02/06/2024 12.9  11.0 - 15.0 % Final    PLATELET COUNT 02/06/2024 274  140 - 400 Thousand/uL Final    MPV 02/06/2024 12.3  7.5 - 12.5 fL Final    ABSOLUTE NEUTROPHILS 02/06/2024 5,432  1,500 - 7,800 cells/uL Final    ABSOLUTE LYMPHOCYTES 02/06/2024 1,776  850 - 3,900 cells/uL Final    ABSOLUTE MONOCYTES 02/06/2024 616  200 - 950 cells/uL Final    ABSOLUTE EOSINOPHILS 02/06/2024 112  15 - 500 cells/uL Final    ABSOLUTE BASOPHILS 02/06/2024 64  0 - 200 cells/uL Final    NEUTROPHILS 02/06/2024 67.9  % Final    LYMPHOCYTES 02/06/2024 22.2  % Final    MONOCYTES 02/06/2024 7.7  % Final    EOSINOPHILS 02/06/2024 1.4  % Final    BASOPHILS 02/06/2024 0.8  % Final    GLUCOSE 02/06/2024 92  65 - 99 mg/dL Final    UREA NITROGEN (BUN) 02/06/2024 12  7 - 25 mg/dL Final    CREATININE 02/06/2024 0.80  0.50 - 0.99 mg/dL Final    EGFR 02/06/2024 93  > OR =  60 mL/min/1.73m2 Final    BUN/CREATININE RATIO 02/06/2024 SEE NOTE:  6 - 22 (calc) Final    SODIUM 02/06/2024 137  135 - 146 mmol/L Final    POTASSIUM 02/06/2024 4.6  3.5 - 5.3 mmol/L Final    CHLORIDE 02/06/2024 106  98 - 110 mmol/L Final    CARBON DIOXIDE 02/06/2024 26  20 - 32 mmol/L Final    CALCIUM 02/06/2024 9.3  8.6 - 10.2 mg/dL Final    PROTEIN, TOTAL 02/06/2024 6.9  6.1 - 8.1 g/dL Final    ALBUMIN 02/06/2024 4.3  3.6 - 5.1 g/dL Final    GLOBULIN 02/06/2024 2.6  1.9 - 3.7 g/dL (calc) Final    ALBUMIN/GLOBULIN RATIO 02/06/2024 1.7  1.0 - 2.5 (calc) Final    BILIRUBIN, TOTAL 02/06/2024 0.5  0.2 - 1.2 mg/dL Final    ALKALINE PHOSPHATASE 02/06/2024 29 (L)  31 - 125 U/L Final    AST 02/06/2024 19  10 - 30 U/L Final    ALT 02/06/2024 11  6 - 29 U/L Final    CHOLESTEROL, TOTAL 02/06/2024 154  <200 mg/dL Final    HDL CHOLESTEROL 02/06/2024 62  > OR = 50 mg/dL Final    TRIGLYCERIDES 02/06/2024 76  <150 mg/dL Final    LDL-CHOLESTEROL 02/06/2024 76  mg/dL (calc) Final    CHOL/HDLC RATIO 02/06/2024 2.5  <5.0 (calc) Final    NON-HDL CHOLESTEROL 02/06/2024 92  <130 mg/dL (calc) Final    TSH W/REFLEX TO FT4 02/06/2024 2.14  mIU/L Final        A/P: India Chew is 43 year old yo female here for complete physical.  1. Healthcare Maintenance. Discussed eye exam, dentist visit q6 months, sunscreen, exercise at least 5x/week, 30 minutes daily, and limiting caffeine intake.    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 Declines to get Tdap 6 months early. TB test recommend and she prefers [facility] which was ordered today.    Outpatient Encounter Medications as of 2/8/2024   Medication Sig Dispense Refill    Meloxicam 7.5 MG Oral Tab Take 1 tablet (7.5 mg total) by mouth daily. 14 tablet 0    fluticasone propionate 50 MCG/ACT Nasal Suspension 2 sprays by Each Nare route daily. 16 g 0    NON FORMULARY Take by mouth As Directed. B COMPLEX WITH VITAMIN C  (VITAMIN B COMPLEX-C ORAL)      NON FORMULARY Take by mouth as needed. ergocalciferol, vitamin D2, (VITAMIN D ORAL)      ARMOUR THYROID 30 MG Oral Tab Take 1 tablet (30 mg total) by mouth daily.      Tamoxifen Citrate 20 MG Oral Tab Take 1 tablet (20 mg total) by mouth daily.      Cyanocobalamin (B-12 OR) Take by mouth.      Ergocalciferol (VITAMIN D OR) Take by mouth.       No facility-administered encounter medications on file as of 2/8/2024.           Side effects, risks and benefits of medications were explained.  The patient or responsible adult showed the ability to learn, asked appropriate questions.  There were no barriers to learning and they verbalized understanding of the treatment plan.     Medication list provided to patient and /or family member.     This note was created in part by Dragon recognition software.  Please excuse errors.

## 2024-02-15 ENCOUNTER — PATIENT MESSAGE (OUTPATIENT)
Dept: FAMILY MEDICINE CLINIC | Facility: CLINIC | Age: 44
End: 2024-02-15

## 2024-02-15 LAB
MEASLES ANTIBODY (IGG): 146 AU/ML
MITOGEN-NIL: 7.79 IU/ML
MUMPS VIRUS$ANTIBODY (IGG): 19 AU/ML
NIL: 0.03 IU/ML
QUANTIFERON(R)-TB GOLD PLUS, 1 TUBE: NEGATIVE
RUBELLA ANTIBODY (IGG): 3.33 INDEX
TB1-NIL: <0 IU/ML
TB2-NIL: 0 IU/ML

## 2024-02-20 NOTE — TELEPHONE ENCOUNTER
Completed and given to  for copy and pickup. Front to inform pt  
From: India Chew  To: Norma Buckley  Sent: 2/15/2024 4:37 PM CST  Subject: Physical form    Hi! I saw bloodwork is in. Can you let me know when you’re able to fill out the physical form & I’ll come pick it up  Thanks   
Lm letting pt know form is ready for   Original in front drawer  Copy made for tickler and scanning   
Abdomen soft, non-tender, no guarding.

## 2025-03-28 ENCOUNTER — TELEPHONE (OUTPATIENT)
Dept: FAMILY MEDICINE CLINIC | Facility: CLINIC | Age: 45
End: 2025-03-28

## 2025-03-28 NOTE — TELEPHONE ENCOUNTER
Pt called her head, ears and eye hurt. Thought she was getting a cold. Ears feel pressure and eye is throbbing. Please call and advise

## 2025-07-01 ENCOUNTER — PATIENT OUTREACH (OUTPATIENT)
Dept: FAMILY MEDICINE CLINIC | Facility: CLINIC | Age: 45
End: 2025-07-01

## 2025-07-11 ENCOUNTER — PATIENT OUTREACH (OUTPATIENT)
Dept: FAMILY MEDICINE CLINIC | Facility: CLINIC | Age: 45
End: 2025-07-11

## (undated) NOTE — Clinical Note
I had the pleasure of seeing Cherelle Oppenheim on 5/21/2019. Please see my attached note. Rosemarie Adler MD [facility]--Surgery